# Patient Record
Sex: FEMALE | Race: WHITE | Employment: PART TIME | URBAN - METROPOLITAN AREA
[De-identification: names, ages, dates, MRNs, and addresses within clinical notes are randomized per-mention and may not be internally consistent; named-entity substitution may affect disease eponyms.]

---

## 2019-02-14 PROBLEM — F32.81 PMDD (PREMENSTRUAL DYSPHORIC DISORDER): Status: ACTIVE | Noted: 2019-02-14

## 2019-02-24 PROBLEM — R82.71 GBS BACTERIURIA: Status: ACTIVE | Noted: 2019-02-24

## 2019-06-23 PROBLEM — Z34.03 ENCOUNTER FOR SUPERVISION OF NORMAL FIRST PREGNANCY IN THIRD TRIMESTER: Status: ACTIVE | Noted: 2019-06-23

## 2019-08-26 ENCOUNTER — HOSPITAL ENCOUNTER (INPATIENT)
Age: 24
LOS: 3 days | Discharge: HOME | End: 2019-08-29
Attending: OBSTETRICS & GYNECOLOGY | Admitting: OBSTETRICS & GYNECOLOGY
Payer: COMMERCIAL

## 2019-08-26 PROBLEM — Z3A.39 39 WEEKS GESTATION OF PREGNANCY: Status: ACTIVE | Noted: 2019-08-26

## 2019-08-26 PROCEDURE — 1220000000 HC SEMI PRIVATE OB R&B

## 2019-08-26 RX ORDER — SODIUM CHLORIDE, SODIUM LACTATE, POTASSIUM CHLORIDE, CALCIUM CHLORIDE 600; 310; 30; 20 MG/100ML; MG/100ML; MG/100ML; MG/100ML
INJECTION, SOLUTION INTRAVENOUS CONTINUOUS
Status: DISCONTINUED | OUTPATIENT
Start: 2019-08-27 | End: 2019-08-27

## 2019-08-27 ENCOUNTER — ANESTHESIA EVENT (OUTPATIENT)
Dept: LABOR AND DELIVERY | Age: 24
End: 2019-08-27

## 2019-08-27 ENCOUNTER — ANESTHESIA (OUTPATIENT)
Dept: LABOR AND DELIVERY | Age: 24
End: 2019-08-27

## 2019-08-27 LAB
ABO/RH: NORMAL
AMPHETAMINE SCREEN, URINE: NOT DETECTED
ANTIBODY SCREEN: NORMAL
BARBITURATE SCREEN URINE: NOT DETECTED
BENZODIAZEPINE SCREEN, URINE: NOT DETECTED
CANNABINOID SCREEN URINE: NOT DETECTED
COCAINE METABOLITE SCREEN URINE: NOT DETECTED
HCT VFR BLD CALC: 35.7 % (ref 34–48)
HEMOGLOBIN: 11.7 G/DL (ref 11.5–15.5)
Lab: NORMAL
MCH RBC QN AUTO: 29.5 PG (ref 26–35)
MCHC RBC AUTO-ENTMCNC: 32.8 % (ref 32–34.5)
MCV RBC AUTO: 90.2 FL (ref 80–99.9)
METHADONE SCREEN, URINE: NOT DETECTED
OPIATE SCREEN URINE: NOT DETECTED
PDW BLD-RTO: 14.7 FL (ref 11.5–15)
PHENCYCLIDINE SCREEN URINE: NOT DETECTED
PLATELET # BLD: 221 E9/L (ref 130–450)
PMV BLD AUTO: 11.6 FL (ref 7–12)
PROPOXYPHENE SCREEN: NOT DETECTED
RBC # BLD: 3.96 E12/L (ref 3.5–5.5)
WBC # BLD: 9.9 E9/L (ref 4.5–11.5)

## 2019-08-27 PROCEDURE — 86850 RBC ANTIBODY SCREEN: CPT

## 2019-08-27 PROCEDURE — 2580000003 HC RX 258: Performed by: OBSTETRICS & GYNECOLOGY

## 2019-08-27 PROCEDURE — 85027 COMPLETE CBC AUTOMATED: CPT

## 2019-08-27 PROCEDURE — 2500000003 HC RX 250 WO HCPCS

## 2019-08-27 PROCEDURE — 86901 BLOOD TYPING SEROLOGIC RH(D): CPT

## 2019-08-27 PROCEDURE — 36415 COLL VENOUS BLD VENIPUNCTURE: CPT

## 2019-08-27 PROCEDURE — 80307 DRUG TEST PRSMV CHEM ANLYZR: CPT

## 2019-08-27 PROCEDURE — 1220000000 HC SEMI PRIVATE OB R&B

## 2019-08-27 PROCEDURE — 6370000000 HC RX 637 (ALT 250 FOR IP)

## 2019-08-27 PROCEDURE — 0KQM0ZZ REPAIR PERINEUM MUSCLE, OPEN APPROACH: ICD-10-PCS | Performed by: OBSTETRICS & GYNECOLOGY

## 2019-08-27 PROCEDURE — 6370000000 HC RX 637 (ALT 250 FOR IP): Performed by: OBSTETRICS & GYNECOLOGY

## 2019-08-27 PROCEDURE — 7200000001 HC VAGINAL DELIVERY

## 2019-08-27 PROCEDURE — 6360000002 HC RX W HCPCS

## 2019-08-27 PROCEDURE — 86900 BLOOD TYPING SEROLOGIC ABO: CPT

## 2019-08-27 RX ORDER — LANOLIN 100 %
OINTMENT (GRAM) TOPICAL PRN
Status: DISCONTINUED | OUTPATIENT
Start: 2019-08-27 | End: 2019-08-29 | Stop reason: HOSPADM

## 2019-08-27 RX ORDER — DOCUSATE SODIUM 100 MG/1
100 CAPSULE, LIQUID FILLED ORAL 2 TIMES DAILY
Status: DISCONTINUED | OUTPATIENT
Start: 2019-08-27 | End: 2019-08-29 | Stop reason: HOSPADM

## 2019-08-27 RX ORDER — NALBUPHINE HCL 10 MG/ML
5 AMPUL (ML) INJECTION EVERY 4 HOURS PRN
Status: DISCONTINUED | OUTPATIENT
Start: 2019-08-27 | End: 2019-08-27

## 2019-08-27 RX ORDER — ONDANSETRON 2 MG/ML
4 INJECTION INTRAMUSCULAR; INTRAVENOUS EVERY 6 HOURS PRN
Status: DISCONTINUED | OUTPATIENT
Start: 2019-08-27 | End: 2019-08-27

## 2019-08-27 RX ORDER — SODIUM CHLORIDE 0.9 % (FLUSH) 0.9 %
10 SYRINGE (ML) INJECTION PRN
Status: DISCONTINUED | OUTPATIENT
Start: 2019-08-27 | End: 2019-08-29 | Stop reason: HOSPADM

## 2019-08-27 RX ORDER — FLUOXETINE 10 MG/1
10 TABLET, FILM COATED ORAL DAILY
Status: DISCONTINUED | OUTPATIENT
Start: 2019-08-27 | End: 2019-08-29 | Stop reason: HOSPADM

## 2019-08-27 RX ORDER — IBUPROFEN 800 MG/1
800 TABLET ORAL EVERY 8 HOURS
Status: DISCONTINUED | OUTPATIENT
Start: 2019-08-28 | End: 2019-08-29 | Stop reason: HOSPADM

## 2019-08-27 RX ORDER — FLUOXETINE 10 MG/1
10 CAPSULE ORAL DAILY
Status: DISCONTINUED | OUTPATIENT
Start: 2019-08-27 | End: 2019-08-27 | Stop reason: CLARIF

## 2019-08-27 RX ORDER — IBUPROFEN 800 MG/1
TABLET ORAL
Status: COMPLETED
Start: 2019-08-27 | End: 2019-08-27

## 2019-08-27 RX ORDER — LIDOCAINE HYDROCHLORIDE 10 MG/ML
INJECTION, SOLUTION INFILTRATION; PERINEURAL
Status: DISCONTINUED
Start: 2019-08-27 | End: 2019-08-27

## 2019-08-27 RX ORDER — NALOXONE HYDROCHLORIDE 0.4 MG/ML
0.4 INJECTION, SOLUTION INTRAMUSCULAR; INTRAVENOUS; SUBCUTANEOUS PRN
Status: DISCONTINUED | OUTPATIENT
Start: 2019-08-27 | End: 2019-08-27

## 2019-08-27 RX ORDER — PRENATAL WITH FERROUS FUM AND FOLIC ACID 3080; 920; 120; 400; 22; 1.84; 3; 20; 10; 1; 12; 200; 27; 25; 2 [IU]/1; [IU]/1; MG/1; [IU]/1; MG/1; MG/1; MG/1; MG/1; MG/1; MG/1; UG/1; MG/1; MG/1; MG/1; MG/1
1 TABLET ORAL DAILY
Status: DISCONTINUED | OUTPATIENT
Start: 2019-08-27 | End: 2019-08-29 | Stop reason: HOSPADM

## 2019-08-27 RX ORDER — SODIUM CHLORIDE 0.9 % (FLUSH) 0.9 %
10 SYRINGE (ML) INJECTION EVERY 12 HOURS SCHEDULED
Status: DISCONTINUED | OUTPATIENT
Start: 2019-08-27 | End: 2019-08-29 | Stop reason: HOSPADM

## 2019-08-27 RX ORDER — SODIUM CHLORIDE, SODIUM LACTATE, POTASSIUM CHLORIDE, CALCIUM CHLORIDE 600; 310; 30; 20 MG/100ML; MG/100ML; MG/100ML; MG/100ML
INJECTION, SOLUTION INTRAVENOUS CONTINUOUS
Status: DISCONTINUED | OUTPATIENT
Start: 2019-08-27 | End: 2019-08-29 | Stop reason: HOSPADM

## 2019-08-27 RX ORDER — ACETAMINOPHEN 325 MG/1
650 TABLET ORAL EVERY 4 HOURS PRN
Status: DISCONTINUED | OUTPATIENT
Start: 2019-08-27 | End: 2019-08-29 | Stop reason: HOSPADM

## 2019-08-27 RX ORDER — FERROUS SULFATE 325(65) MG
325 TABLET ORAL 2 TIMES DAILY WITH MEALS
Status: DISCONTINUED | OUTPATIENT
Start: 2019-08-27 | End: 2019-08-29 | Stop reason: HOSPADM

## 2019-08-27 RX ORDER — ACETAMINOPHEN 650 MG
TABLET, EXTENDED RELEASE ORAL
Status: COMPLETED
Start: 2019-08-27 | End: 2019-08-27

## 2019-08-27 RX ORDER — BISACODYL 10 MG
10 SUPPOSITORY, RECTAL RECTAL PRN
Status: DISCONTINUED | OUTPATIENT
Start: 2019-08-27 | End: 2019-08-29 | Stop reason: HOSPADM

## 2019-08-27 RX ORDER — HYDROCODONE BITARTRATE AND ACETAMINOPHEN 5; 325 MG/1; MG/1
2 TABLET ORAL EVERY 4 HOURS PRN
Status: DISCONTINUED | OUTPATIENT
Start: 2019-08-27 | End: 2019-08-29 | Stop reason: HOSPADM

## 2019-08-27 RX ORDER — HYDROCODONE BITARTRATE AND ACETAMINOPHEN 5; 325 MG/1; MG/1
1 TABLET ORAL EVERY 4 HOURS PRN
Status: DISCONTINUED | OUTPATIENT
Start: 2019-08-27 | End: 2019-08-29 | Stop reason: HOSPADM

## 2019-08-27 RX ADMIN — BENZOCAINE AND LEVOMENTHOL: 200; 5 SPRAY TOPICAL at 06:10

## 2019-08-27 RX ADMIN — DOCUSATE SODIUM 100 MG: 100 CAPSULE, LIQUID FILLED ORAL at 21:19

## 2019-08-27 RX ADMIN — IBUPROFEN 800 MG: 800 TABLET, FILM COATED ORAL at 18:51

## 2019-08-27 RX ADMIN — SODIUM CHLORIDE, POTASSIUM CHLORIDE, SODIUM LACTATE AND CALCIUM CHLORIDE: 600; 310; 30; 20 INJECTION, SOLUTION INTRAVENOUS at 00:15

## 2019-08-27 RX ADMIN — LIDOCAINE HYDROCHLORIDE: 10 INJECTION, SOLUTION INFILTRATION; PERINEURAL at 03:20

## 2019-08-27 RX ADMIN — SODIUM CHLORIDE, POTASSIUM CHLORIDE, SODIUM LACTATE AND CALCIUM CHLORIDE: 600; 310; 30; 20 INJECTION, SOLUTION INTRAVENOUS at 01:50

## 2019-08-27 RX ADMIN — Medication 999 MILLI-UNITS/MIN: at 03:20

## 2019-08-27 RX ADMIN — Medication: at 10:10

## 2019-08-27 RX ADMIN — Medication: at 03:18

## 2019-08-27 RX ADMIN — HYDROCODONE BITARTRATE AND ACETAMINOPHEN 1 TABLET: 5; 325 TABLET ORAL at 16:00

## 2019-08-27 RX ADMIN — HYDROCODONE BITARTRATE AND ACETAMINOPHEN 1 TABLET: 5; 325 TABLET ORAL at 06:10

## 2019-08-27 RX ADMIN — DOCUSATE SODIUM 100 MG: 100 CAPSULE, LIQUID FILLED ORAL at 10:10

## 2019-08-27 ASSESSMENT — PAIN SCALES - GENERAL
PAINLEVEL_OUTOF10: 0
PAINLEVEL_OUTOF10: 4
PAINLEVEL_OUTOF10: 0
PAINLEVEL_OUTOF10: 5
PAINLEVEL_OUTOF10: 0
PAINLEVEL_OUTOF10: 6

## 2019-08-27 ASSESSMENT — PAIN DESCRIPTION - RADICULAR PAIN: RADICULAR_PAIN: ABSENT

## 2019-08-27 NOTE — H&P
CHIEF COMPLAINT:  contractions    HISTORY OF PRESENT ILLNESS:      The patient is a 21 y.o. female at 37w11d presents with complaint of contractions that started Saturday and have slowly increased in frequency until tonight when they became every 5 minutes. OB History        1    Para        Term                AB        Living           SAB        TAB        Ectopic        Molar        Multiple        Live Births                Patient presents with a chief complaint as above and is being admitted for latent labor    Estimated Due Date: Estimated Date of Delivery: 19    PRENATAL CARE:    Complicated by: none    PAST OB HISTORY  OB History        1    Para        Term                AB        Living           SAB        TAB        Ectopic        Molar        Multiple        Live Births                    Past Medical History:    No past medical history on file. Past Surgical History:        Procedure Laterality Date    CYST REMOVAL      OTHER SURGICAL HISTORY      back cyst removed    TONSILLECTOMY      WISDOM TOOTH EXTRACTION       Allergies:  Patient has no known allergies.   Social History:    Social History     Socioeconomic History    Marital status: Single     Spouse name: Not on file    Number of children: Not on file    Years of education: Not on file    Highest education level: Not on file   Occupational History    Not on file   Social Needs    Financial resource strain: Not on file    Food insecurity:     Worry: Not on file     Inability: Not on file    Transportation needs:     Medical: Not on file     Non-medical: Not on file   Tobacco Use    Smoking status: Never Smoker    Smokeless tobacco: Never Used   Substance and Sexual Activity    Alcohol use: Not Currently     Comment: occasinoally    Drug use: No    Sexual activity: Not on file   Lifestyle    Physical activity:     Days per week: Not on file     Minutes per session: Not on file    Stress: Not on file   Relationships    Social connections:     Talks on phone: Not on file     Gets together: Not on file     Attends Restoration service: Not on file     Active member of club or organization: Not on file     Attends meetings of clubs or organizations: Not on file     Relationship status: Not on file    Intimate partner violence:     Fear of current or ex partner: Not on file     Emotionally abused: Not on file     Physically abused: Not on file     Forced sexual activity: Not on file   Other Topics Concern    Not on file   Social History Narrative    Not on file     Family History:   No family history on file. Medications Prior to Admission:  Medications Prior to Admission: FLUoxetine (PROZAC) 10 MG capsule, Take 10 mg by mouth daily    REVIEW OF SYSTEMS:    CONSTITUTIONAL:  negative  RESPIRATORY:  negative  CARDIOVASCULAR:  negative  GASTROINTESTINAL:  negative  ALLERGIC/IMMUNOLOGIC:  negative  NEUROLOGICAL:  negative  BEHAVIOR/PSYCH:  negative    PHYSICAL EXAM:  Vitals:    08/26/19 2222   BP: 112/74   Pulse: 85   Resp: 16   Temp: 98.5 °F (36.9 °C)   TempSrc: Oral     General appearance:  awake, alert, cooperative, no apparent distress, and appears stated age  Neurologic:  Awake, alert, oriented to name, place and time. Lungs:  No increased work of breathing, good air exchange  Abdomen:  Soft, non tender, gravid, consistent with her gestational age   Fetal heart rate:  Reassuring.   Pelvis:  Adequate pelvis  Cervix: 3 cm 100% soft -1  Contraction frequency:  2-4 minutes    Membranes:  Intact    ASSESSMENT AND PLAN:    Labor: admit  Fetus: Reassuring  Other: d/w Dr Miguel Angel Davis      Electronically signed by Donavon Clark DO on 8/26/2019 at 10:53 PM

## 2019-08-28 LAB
HCT VFR BLD CALC: 35.3 % (ref 34–48)
HEMOGLOBIN: 10.9 G/DL (ref 11.5–15.5)

## 2019-08-28 PROCEDURE — 36415 COLL VENOUS BLD VENIPUNCTURE: CPT

## 2019-08-28 PROCEDURE — 85018 HEMOGLOBIN: CPT

## 2019-08-28 PROCEDURE — 85014 HEMATOCRIT: CPT

## 2019-08-28 PROCEDURE — 1220000000 HC SEMI PRIVATE OB R&B

## 2019-08-28 PROCEDURE — 6370000000 HC RX 637 (ALT 250 FOR IP): Performed by: OBSTETRICS & GYNECOLOGY

## 2019-08-28 RX ORDER — IBUPROFEN 800 MG/1
800 TABLET ORAL EVERY 8 HOURS
Qty: 30 TABLET | Refills: 0 | Status: SHIPPED | OUTPATIENT
Start: 2019-08-28 | End: 2019-10-09

## 2019-08-28 RX ADMIN — DOCUSATE SODIUM 100 MG: 100 CAPSULE, LIQUID FILLED ORAL at 09:39

## 2019-08-28 RX ADMIN — IBUPROFEN 800 MG: 800 TABLET, FILM COATED ORAL at 21:30

## 2019-08-28 RX ADMIN — Medication 1 TABLET: at 09:39

## 2019-08-28 RX ADMIN — IBUPROFEN 800 MG: 800 TABLET, FILM COATED ORAL at 03:58

## 2019-08-28 RX ADMIN — HYDROCODONE BITARTRATE AND ACETAMINOPHEN 1 TABLET: 5; 325 TABLET ORAL at 13:53

## 2019-08-28 RX ADMIN — IBUPROFEN 800 MG: 800 TABLET, FILM COATED ORAL at 12:12

## 2019-08-28 RX ADMIN — DOCUSATE SODIUM 100 MG: 100 CAPSULE, LIQUID FILLED ORAL at 21:31

## 2019-08-28 ASSESSMENT — PAIN DESCRIPTION - PROGRESSION: CLINICAL_PROGRESSION: NOT CHANGED

## 2019-08-28 ASSESSMENT — PAIN SCALES - GENERAL
PAINLEVEL_OUTOF10: 4
PAINLEVEL_OUTOF10: 5
PAINLEVEL_OUTOF10: 4
PAINLEVEL_OUTOF10: 4

## 2019-08-28 ASSESSMENT — PAIN DESCRIPTION - RADICULAR PAIN: RADICULAR_PAIN: ABSENT

## 2019-08-28 NOTE — LACTATION NOTE
Left nipple noted to be reddened and sore, shield appears to have been placed incorrectly for a feeding. Encouraged to make sure nipple is centered in the shield during feedings. Gel pads given and explained. Baby nursing on the right, no c/o pain with this feeding, nipple appears to fit correctly in the shield. Encouraged mom to call with any latch assistance needed.

## 2019-08-28 NOTE — PROGRESS NOTES
CLINICAL PHARMACY NOTE: MEDS TO [address] Select Patient?: Yes  Total # of Prescriptions Filled: 1   The following medications were delivered to the patient:  · Motrin 800  Total # of Interventions Completed: 3  Time Spent (min): 30    Additional Documentation:

## 2019-08-29 VITALS
WEIGHT: 160 LBS | SYSTOLIC BLOOD PRESSURE: 113 MMHG | RESPIRATION RATE: 16 BRPM | HEIGHT: 61 IN | TEMPERATURE: 97.9 F | BODY MASS INDEX: 30.21 KG/M2 | DIASTOLIC BLOOD PRESSURE: 71 MMHG | HEART RATE: 64 BPM

## 2019-08-29 PROCEDURE — 6370000000 HC RX 637 (ALT 250 FOR IP): Performed by: OBSTETRICS & GYNECOLOGY

## 2019-08-29 RX ADMIN — DOCUSATE SODIUM 100 MG: 100 CAPSULE, LIQUID FILLED ORAL at 09:43

## 2019-08-29 RX ADMIN — IBUPROFEN 800 MG: 800 TABLET, FILM COATED ORAL at 09:43

## 2019-08-29 ASSESSMENT — PAIN SCALES - GENERAL: PAINLEVEL_OUTOF10: 4

## 2019-08-29 NOTE — DISCHARGE SUMMARY
Obstetrical Discharge Form        Patient ID:  Juan Hernández  [id number]  6 y.o.  1995    Admit date: 2019    Discharge date: 2019     Admitting Physician: Agustina Hinton Diagnoses: 44 weeks gestation of pregnancy [Z3A.39]    Final Diagnosis:   Active Problems:    39 weeks gestation of pregnancy  Resolved Problems:    * No resolved hospital problems. *      Discharged Condition: good      Gestational Age:40w0d    Antepartum complications: none    Date of Delivery: 2019      Type of Delivery: vaginal, spontaneous    Delivered By:   Agustina Leong           Baby:     Information for the patient's :  [facility]. [[id number]]          Anesthesia: Epidural    Intrapartum complications: None    Feeding method: breast    Hospital Course: Uneventful.   Patient recovered well without any issues     Discharged Condition: stable to home    Discharge Medication:    Carine Crowder   Home Medication Instructions BIX:[id number]    Printed on:19 1555   Medication Information                      FLUoxetine (PROZAC) 10 MG capsule  Take 10 mg by mouth daily             ibuprofen (ADVIL;MOTRIN) 800 MG tablet  Take 1 tablet by mouth every 8 hours             Prenatal Multivit-Min-Fe-FA (PRENATAL VITAMINS PO)  Take 1 tablet by mouth daily                  Postpartum complications: none    Note that over 30 minutes was spent in preparing discharge papers, discussing discharge with patient, medication review, etc.    Discharge Date: 2019    Plan:   Follow up in 6 week(s)

## 2019-10-10 ENCOUNTER — TELEPHONE (OUTPATIENT)
Dept: ADMINISTRATIVE | Age: 24
End: 2019-10-10

## 2021-03-17 PROBLEM — Z34.03 ENCOUNTER FOR SUPERVISION OF NORMAL FIRST PREGNANCY IN THIRD TRIMESTER: Status: RESOLVED | Noted: 2019-06-23 | Resolved: 2021-03-17

## 2021-03-17 PROBLEM — Z3A.39 39 WEEKS GESTATION OF PREGNANCY: Status: RESOLVED | Noted: 2019-08-26 | Resolved: 2021-03-17

## 2025-06-09 ENCOUNTER — HOSPITAL ENCOUNTER (INPATIENT)
Age: 30
LOS: 3 days | Discharge: HOME | DRG: 750 | End: 2025-06-12
Attending: EMERGENCY MEDICINE | Admitting: PSYCHIATRY & NEUROLOGY
Payer: COMMERCIAL

## 2025-06-09 DIAGNOSIS — R45.851 SUICIDAL IDEATION: Primary | ICD-10-CM

## 2025-06-09 PROBLEM — F31.12 BIPOLAR 1 DISORDER, MANIC, MODERATE (HCC): Status: ACTIVE | Noted: 2025-06-09

## 2025-06-09 LAB
AMPHET UR QL SCN: NEGATIVE
ANION GAP SERPL CALCULATED.3IONS-SCNC: 12 MMOL/L (ref 7–16)
APAP SERPL-MCNC: <5 UG/ML (ref 10–30)
BARBITURATES UR QL SCN: NEGATIVE
BASOPHILS # BLD: 0.02 K/UL (ref 0–0.2)
BASOPHILS NFR BLD: 0 % (ref 0–2)
BENZODIAZ UR QL: NEGATIVE
BUN SERPL-MCNC: 9 MG/DL (ref 6–20)
BUPRENORPHINE UR QL: NEGATIVE
CALCIUM SERPL-MCNC: 9.3 MG/DL (ref 8.6–10)
CANNABINOIDS UR QL SCN: POSITIVE
CHLORIDE SERPL-SCNC: 99 MMOL/L (ref 98–107)
CO2 SERPL-SCNC: 26 MMOL/L (ref 22–29)
COCAINE UR QL SCN: NEGATIVE
CREAT SERPL-MCNC: 0.7 MG/DL (ref 0.5–1)
EOSINOPHIL # BLD: 0.06 K/UL (ref 0.05–0.5)
EOSINOPHILS RELATIVE PERCENT: 1 % (ref 0–6)
ERYTHROCYTE [DISTWIDTH] IN BLOOD BY AUTOMATED COUNT: 13.1 % (ref 11.5–15)
ETHANOLAMINE SERPL-MCNC: <10 MG/DL (ref 0–0.08)
FENTANYL UR QL: NEGATIVE
GFR, ESTIMATED: >90 ML/MIN/1.73M2
GLUCOSE SERPL-MCNC: 95 MG/DL (ref 74–99)
HCG UR QL: NEGATIVE
HCT VFR BLD AUTO: 38.7 % (ref 34–48)
HGB BLD-MCNC: 13 G/DL (ref 11.5–15.5)
IMM GRANULOCYTES # BLD AUTO: 0.06 K/UL (ref 0–0.58)
IMM GRANULOCYTES NFR BLD: 1 % (ref 0–5)
LYMPHOCYTES NFR BLD: 1.7 K/UL (ref 1.5–4)
LYMPHOCYTES RELATIVE PERCENT: 20 % (ref 20–42)
MAGNESIUM SERPL-MCNC: 1.7 MG/DL (ref 1.6–2.6)
MCH RBC QN AUTO: 33.3 PG (ref 26–35)
MCHC RBC AUTO-ENTMCNC: 33.6 G/DL (ref 32–34.5)
MCV RBC AUTO: 99.2 FL (ref 80–99.9)
METHADONE UR QL: NEGATIVE
MONOCYTES NFR BLD: 0.78 K/UL (ref 0.1–0.95)
MONOCYTES NFR BLD: 9 % (ref 2–12)
NEUTROPHILS NFR BLD: 70 % (ref 43–80)
NEUTS SEG NFR BLD: 5.96 K/UL (ref 1.8–7.3)
OPIATES UR QL SCN: NEGATIVE
OXYCODONE UR QL SCN: NEGATIVE
PCP UR QL SCN: NEGATIVE
PLATELET # BLD AUTO: 257 K/UL (ref 130–450)
PMV BLD AUTO: 9.8 FL (ref 7–12)
POTASSIUM SERPL-SCNC: 3.2 MMOL/L (ref 3.5–5.1)
POTASSIUM SERPL-SCNC: 3.6 MMOL/L (ref 3.5–5.1)
RBC # BLD AUTO: 3.9 M/UL (ref 3.5–5.5)
SALICYLATES SERPL-MCNC: <0.5 MG/DL (ref 0–30)
SODIUM SERPL-SCNC: 137 MMOL/L (ref 136–145)
TEST INFORMATION: ABNORMAL
TOXIC TRICYCLIC SC,BLOOD: NEGATIVE
WBC OTHER # BLD: 8.6 K/UL (ref 4.5–11.5)

## 2025-06-09 PROCEDURE — 1240000000 HC EMOTIONAL WELLNESS R&B

## 2025-06-09 PROCEDURE — 80048 BASIC METABOLIC PNL TOTAL CA: CPT

## 2025-06-09 PROCEDURE — 80143 DRUG ASSAY ACETAMINOPHEN: CPT

## 2025-06-09 PROCEDURE — 85025 COMPLETE CBC W/AUTO DIFF WBC: CPT

## 2025-06-09 PROCEDURE — 84703 CHORIONIC GONADOTROPIN ASSAY: CPT

## 2025-06-09 PROCEDURE — 84132 ASSAY OF SERUM POTASSIUM: CPT

## 2025-06-09 PROCEDURE — 93005 ELECTROCARDIOGRAM TRACING: CPT

## 2025-06-09 PROCEDURE — 80307 DRUG TEST PRSMV CHEM ANLYZR: CPT

## 2025-06-09 PROCEDURE — 90791 PSYCH DIAGNOSTIC EVALUATION: CPT

## 2025-06-09 PROCEDURE — 6370000000 HC RX 637 (ALT 250 FOR IP): Performed by: PSYCHIATRY & NEUROLOGY

## 2025-06-09 PROCEDURE — 80179 DRUG ASSAY SALICYLATE: CPT

## 2025-06-09 PROCEDURE — 99285 EMERGENCY DEPT VISIT HI MDM: CPT

## 2025-06-09 PROCEDURE — 83735 ASSAY OF MAGNESIUM: CPT

## 2025-06-09 PROCEDURE — G0480 DRUG TEST DEF 1-7 CLASSES: HCPCS

## 2025-06-09 RX ORDER — NICOTINE 21 MG/24HR
1 PATCH, TRANSDERMAL 24 HOURS TRANSDERMAL DAILY
Status: DISCONTINUED | OUTPATIENT
Start: 2025-06-09 | End: 2025-06-11

## 2025-06-09 RX ORDER — HALOPERIDOL 5 MG/ML
5 INJECTION INTRAMUSCULAR EVERY 6 HOURS PRN
Status: DISCONTINUED | OUTPATIENT
Start: 2025-06-09 | End: 2025-06-12 | Stop reason: HOSPADM

## 2025-06-09 RX ORDER — FLUOXETINE 10 MG/1
40 TABLET, FILM COATED ORAL DAILY
COMMUNITY

## 2025-06-09 RX ORDER — ATOMOXETINE 60 MG/1
60 CAPSULE ORAL DAILY
COMMUNITY

## 2025-06-09 RX ORDER — ACETAMINOPHEN 325 MG/1
650 TABLET ORAL EVERY 6 HOURS PRN
Status: DISCONTINUED | OUTPATIENT
Start: 2025-06-09 | End: 2025-06-12 | Stop reason: HOSPADM

## 2025-06-09 RX ORDER — HALOPERIDOL 5 MG/1
5 TABLET ORAL EVERY 6 HOURS PRN
Status: DISCONTINUED | OUTPATIENT
Start: 2025-06-09 | End: 2025-06-12 | Stop reason: HOSPADM

## 2025-06-09 RX ORDER — HYDROXYZINE HYDROCHLORIDE 50 MG/1
50 TABLET, FILM COATED ORAL 3 TIMES DAILY PRN
Status: DISCONTINUED | OUTPATIENT
Start: 2025-06-09 | End: 2025-06-12 | Stop reason: HOSPADM

## 2025-06-09 RX ORDER — ATOMOXETINE 40 MG/1
40 CAPSULE ORAL DAILY
Status: ON HOLD | COMMUNITY
End: 2025-06-12 | Stop reason: HOSPADM

## 2025-06-09 RX ORDER — MAGNESIUM HYDROXIDE/ALUMINUM HYDROXICE/SIMETHICONE 120; 1200; 1200 MG/30ML; MG/30ML; MG/30ML
30 SUSPENSION ORAL PRN
Status: DISCONTINUED | OUTPATIENT
Start: 2025-06-09 | End: 2025-06-12 | Stop reason: HOSPADM

## 2025-06-09 RX ADMIN — Medication 3 MG: at 21:09

## 2025-06-09 ASSESSMENT — PATIENT HEALTH QUESTIONNAIRE - PHQ9
10. IF YOU CHECKED OFF ANY PROBLEMS, HOW DIFFICULT HAVE THESE PROBLEMS MADE IT FOR YOU TO DO YOUR WORK, TAKE CARE OF THINGS AT HOME, OR GET ALONG WITH OTHER PEOPLE: VERY DIFFICULT
7. TROUBLE CONCENTRATING ON THINGS, SUCH AS READING THE NEWSPAPER OR WATCHING TELEVISION: SEVERAL DAYS
5. POOR APPETITE OR OVEREATING: NOT AT ALL
SUM OF ALL RESPONSES TO PHQ QUESTIONS 1-9: 7
2. FEELING DOWN, DEPRESSED OR HOPELESS: MORE THAN HALF THE DAYS
SUM OF ALL RESPONSES TO PHQ QUESTIONS 1-9: 8
8. MOVING OR SPEAKING SO SLOWLY THAT OTHER PEOPLE COULD HAVE NOTICED. OR THE OPPOSITE, BEING SO FIGETY OR RESTLESS THAT YOU HAVE BEEN MOVING AROUND A LOT MORE THAN USUAL: NOT AT ALL
9. THOUGHTS THAT YOU WOULD BE BETTER OFF DEAD, OR OF HURTING YOURSELF: SEVERAL DAYS
1. LITTLE INTEREST OR PLEASURE IN DOING THINGS: MORE THAN HALF THE DAYS
4. FEELING TIRED OR HAVING LITTLE ENERGY: SEVERAL DAYS
SUM OF ALL RESPONSES TO PHQ QUESTIONS 1-9: 8
3. TROUBLE FALLING OR STAYING ASLEEP: SEVERAL DAYS
SUM OF ALL RESPONSES TO PHQ QUESTIONS 1-9: 8
6. FEELING BAD ABOUT YOURSELF - OR THAT YOU ARE A FAILURE OR HAVE LET YOURSELF OR YOUR FAMILY DOWN: NOT AT ALL

## 2025-06-09 ASSESSMENT — SLEEP AND FATIGUE QUESTIONNAIRES
SLEEP PATTERN: DIFFICULTY FALLING ASLEEP
DO YOU USE A SLEEP AID: NO
AVERAGE NUMBER OF SLEEP HOURS: 4
DO YOU HAVE DIFFICULTY SLEEPING: YES

## 2025-06-09 ASSESSMENT — LIFESTYLE VARIABLES
HOW OFTEN DO YOU HAVE A DRINK CONTAINING ALCOHOL: 2-3 TIMES A WEEK
HOW MANY STANDARD DRINKS CONTAINING ALCOHOL DO YOU HAVE ON A TYPICAL DAY: 3 OR 4
HOW OFTEN DO YOU HAVE A DRINK CONTAINING ALCOHOL: 2-3 TIMES A WEEK
HOW MANY STANDARD DRINKS CONTAINING ALCOHOL DO YOU HAVE ON A TYPICAL DAY: 3 OR 4

## 2025-06-09 ASSESSMENT — PAIN - FUNCTIONAL ASSESSMENT: PAIN_FUNCTIONAL_ASSESSMENT: NONE - DENIES PAIN

## 2025-06-09 NOTE — VIRTUAL HEALTH
Received request for Telepsychiatry evaluation.  Unable to reach site on multiple attempts, to coordinate moving video equipment for evaluation.  Will check back later, or reach out via 2C2Pve in the interim, if ER staff are available to assist and Medical screening labs are still pending.  Our team will continue to follow and will initiate evaluation when more information is available    I attempted to call to gain more information however during shift change and call was left on hold until it eventually hung up.    Patient made statement that she does not drink and stated that she does drink.  At this time we are waiting for EtOH levels to be resulted                  Daniela De La Cruz, was evaluated through a synchronous (real-time) audio-video encounter. The patient (and/or guardian if applicable) is aware that this is a billable service, which includes applicable co-pays. This virtual visit was conducted with patient's (and/or legal guardian's) consent. Patient identification was verified, and a caregiver was present when appropriate.  The patient was located at Facility (Appt Department): [facility] EMERGENCY DEPARTMENT  [facility] [place] [address]  Loc: [phone]  The provider was located at Home (City/State): [place]  Confirm you are appropriately licensed, registered, or certified to deliver care in the state where the patient is located as indicated above. If you are not or unsure, please re-schedule the visit: Yes, I confirm.   [place] Consult to Tele-Psych  Consult performed by: Nena Carbajal APRN - CNP  Consult ordered by: Leila Mcneill MD  Reason for consult: Psychiatric evaluation           Total time spent on this encounter: Not billed by time    --TANIYA Lowery CNP on 6/9/2025 at 6:41 AM    An electronic signature was used to authenticate this note.    
started to do anything, or prepared to do anything to end your life? Yes   Did this occur within the past 3 months?  No    :    Protective Factors:  Protective: Female gender, Does not have lethal plan, Does not have access to guns, No active psychosis or cognitive dysfunction, Has outpatient services in place, and Compliant with recommended medications  Risk Factors:  Risk Factors: Depressed mood, Active suicidal ideation, Prior suicide attempt, Recent significant loss, and No collateral information to support safety      Overall Level Suicide Risk:     TelePsych CSSRS Risk Level: High Risk    Brief ClinicalSummary:   Patient is a 29 y.o.  female who presents for suicidal ideation. Pt denies a plan currently but states she has multiple previous attempts and usually doesn't plan them, she just acts impulsively, and is concerned she will harm herself if she doesn't get help. She reports multiple psychosocial stressors, limited support, and doesn't feel her medications are helping to manage her symptoms. Pt denies HI, AH/VH, access to weapons but continues to endorse SI and is unable to contract for safety. Pt requires inpatient psychiatric admission for safety and stabilization. ED provider in agreement.     Dx:   Suicidal Ideation  Mood Disorder     Plan:  Inpatient psychiatric admission at appropriate care level facility, once medically cleared and stable  Ongoing medical management and stabilization per primary team.  Re-consult for any new changes or concerns. Thank you for this consult.  Discussed recommendations with Trena Arias MD at time of consult completion.    TelePsych recommendations:Inpatient psychiatric admission            Safety Plan:  Reviewed and Updated      Electronically signed by KALLI Allison on 6/9/2025 at 8:54 AM.     Daniela De La Cruz, was evaluated through a synchronous (real-time) audio-video encounter. The patient (and/or guardian if applicable) is aware that this is a

## 2025-06-09 NOTE — ED NOTES
Nurse to nurse report given to DAYANNA Sheikh on [address] , which includes patient presentation complaint, pink slip, medications, lab results EKG Qtc, and past medical/psych history and admitting orders.

## 2025-06-09 NOTE — ED PROVIDER NOTES
[facility] EMERGENCY DEPARTMENT  EMERGENCY DEPARTMENT ENCOUNTER        Pt Name: Daniela De La Cruz  MRN: [id number]  Birthdate 1995  Date of evaluation: 6/9/2025  Provider: Leila Mcneill MD  PCP: None, None  Note Started: 6:55 AM EDT 6/9/25    CHIEF COMPLAINT       Chief Complaint   Patient presents with    Psychiatric Evaluation     Patient tearful, states that she is suicidal and that if she doesn't check in that she will hurt herself.  States she is having marriage issues.  Denies HI.       HISTORY OF PRESENT ILLNESS: 1 or more Elements   History From: Patient    Limitations to history : None    Daniela De La Cruz is a 29 y.o. female who presents for psychiatric evaluation.  The patient states she has suicidal ideation and is fearful of herself.  She states she is having marriage issues.  She is recently told her  that she no longer wants to be  and she states he is not taking a while and gaslighting her.  She reports she has had issues with suicidal ideation since she was 7 years old and has been going through therapy.  She denies a plan to harm herself.  She states she needed to come to the hospital or she may harm herself or her .  Denies alcohol or drug use recently but states she does drink about 3 times a week.  She has never gone through withdrawals.  Denies chest pain, shortness of breath, abdominal pain, headache, dizziness or other associated symptoms.    Nursing Notes were all reviewed and agreed with or any disagreements were addressed in the HPI.        REVIEW OF EXTERNAL NOTE :       Patient was seen in office on 8/22/2024 by [facility] clinic for rash    REVIEW OF SYSTEMS :           Positives and Pertinent negatives as per HPI.     SURGICAL HISTORY     Past Surgical History:   Procedure Laterality Date    CYST REMOVAL      OTHER SURGICAL HISTORY      back cyst removed    TONSILLECTOMY      WISDOM TOOTH EXTRACTION         CURRENTMEDICATIONS

## 2025-06-09 NOTE — ED NOTES
Pt's belongings including a phone placed in a labeled belongings bag with a pink/purple backpack placed in locker 26 in G unit

## 2025-06-10 PROBLEM — F31.12 BIPOLAR 1 DISORDER, MANIC, MODERATE (HCC): Status: RESOLVED | Noted: 2025-06-09 | Resolved: 2025-06-10

## 2025-06-10 PROBLEM — F60.89 CLUSTER B PERSONALITY DISORDER (HCC): Status: ACTIVE | Noted: 2025-06-10

## 2025-06-10 PROBLEM — F33.9 MAJOR DEPRESSIVE DISORDER, RECURRENT EPISODE WITH MIXED FEATURES: Status: ACTIVE | Noted: 2025-06-10

## 2025-06-10 LAB
CHOLEST SERPL-MCNC: 178 MG/DL
EKG ATRIAL RATE: 65 BPM
EKG P AXIS: 40 DEGREES
EKG P-R INTERVAL: 124 MS
EKG Q-T INTERVAL: 414 MS
EKG QRS DURATION: 88 MS
EKG QTC CALCULATION (BAZETT): 430 MS
EKG R AXIS: -14 DEGREES
EKG T AXIS: 25 DEGREES
EKG VENTRICULAR RATE: 65 BPM
HBA1C MFR BLD: 5.3 % (ref 4–5.6)
HDLC SERPL-MCNC: 68 MG/DL
LDLC SERPL CALC-MCNC: 96 MG/DL
TRIGL SERPL-MCNC: 70 MG/DL
VLDLC SERPL CALC-MCNC: 14 MG/DL

## 2025-06-10 PROCEDURE — 87081 CULTURE SCREEN ONLY: CPT

## 2025-06-10 PROCEDURE — 93010 ELECTROCARDIOGRAM REPORT: CPT | Performed by: INTERNAL MEDICINE

## 2025-06-10 PROCEDURE — 87210 SMEAR WET MOUNT SALINE/INK: CPT

## 2025-06-10 PROCEDURE — 1240000000 HC EMOTIONAL WELLNESS R&B

## 2025-06-10 PROCEDURE — 6370000000 HC RX 637 (ALT 250 FOR IP): Performed by: NURSE PRACTITIONER

## 2025-06-10 PROCEDURE — 83036 HEMOGLOBIN GLYCOSYLATED A1C: CPT

## 2025-06-10 PROCEDURE — 36415 COLL VENOUS BLD VENIPUNCTURE: CPT

## 2025-06-10 PROCEDURE — 80061 LIPID PANEL: CPT

## 2025-06-10 RX ORDER — ATOMOXETINE 100 MG/1
100 CAPSULE ORAL NIGHTLY
Status: DISCONTINUED | OUTPATIENT
Start: 2025-06-10 | End: 2025-06-12 | Stop reason: HOSPADM

## 2025-06-10 RX ORDER — OXCARBAZEPINE 150 MG/1
150 TABLET, FILM COATED ORAL 2 TIMES DAILY
Status: DISCONTINUED | OUTPATIENT
Start: 2025-06-10 | End: 2025-06-12 | Stop reason: HOSPADM

## 2025-06-10 RX ADMIN — FLUOXETINE HYDROCHLORIDE 40 MG: 20 CAPSULE ORAL at 20:25

## 2025-06-10 RX ADMIN — ATOMOXETINE 100 MG: 100 CAPSULE ORAL at 20:25

## 2025-06-10 RX ADMIN — OXCARBAZEPINE 150 MG: 150 TABLET, FILM COATED ORAL at 21:10

## 2025-06-10 ASSESSMENT — PATIENT HEALTH QUESTIONNAIRE - PHQ9
7. TROUBLE CONCENTRATING ON THINGS, SUCH AS READING THE NEWSPAPER OR WATCHING TELEVISION: SEVERAL DAYS
6. FEELING BAD ABOUT YOURSELF - OR THAT YOU ARE A FAILURE OR HAVE LET YOURSELF OR YOUR FAMILY DOWN: SEVERAL DAYS
10. IF YOU CHECKED OFF ANY PROBLEMS, HOW DIFFICULT HAVE THESE PROBLEMS MADE IT FOR YOU TO DO YOUR WORK, TAKE CARE OF THINGS AT HOME, OR GET ALONG WITH OTHER PEOPLE: VERY DIFFICULT
9. THOUGHTS THAT YOU WOULD BE BETTER OFF DEAD, OR OF HURTING YOURSELF: NEARLY EVERY DAY
SUM OF ALL RESPONSES TO PHQ QUESTIONS 1-9: 13
8. MOVING OR SPEAKING SO SLOWLY THAT OTHER PEOPLE COULD HAVE NOTICED. OR THE OPPOSITE, BEING SO FIGETY OR RESTLESS THAT YOU HAVE BEEN MOVING AROUND A LOT MORE THAN USUAL: MORE THAN HALF THE DAYS
SUM OF ALL RESPONSES TO PHQ QUESTIONS 1-9: 10
1. LITTLE INTEREST OR PLEASURE IN DOING THINGS: SEVERAL DAYS
4. FEELING TIRED OR HAVING LITTLE ENERGY: MORE THAN HALF THE DAYS
3. TROUBLE FALLING OR STAYING ASLEEP: SEVERAL DAYS
2. FEELING DOWN, DEPRESSED OR HOPELESS: MORE THAN HALF THE DAYS
5. POOR APPETITE OR OVEREATING: NOT AT ALL

## 2025-06-10 ASSESSMENT — SLEEP AND FATIGUE QUESTIONNAIRES
DO YOU USE A SLEEP AID: YES
SLEEP PATTERN: NORMAL
AVERAGE NUMBER OF SLEEP HOURS: 10
DO YOU HAVE DIFFICULTY SLEEPING: NO

## 2025-06-10 ASSESSMENT — LIFESTYLE VARIABLES: HOW OFTEN DO YOU HAVE A DRINK CONTAINING ALCOHOL: 2-3 TIMES A WEEK

## 2025-06-10 NOTE — GROUP NOTE
Group Therapy Note    Date: 6/10/2025    Group Start Time: 1045  Group End Time: 1130  Group Topic: Psychotherapy    [facility]    Allison Storey MSW, LSW        Group Therapy Note    Attendees: 6       Patient's Goal:  To increase social interaction and improve relationships with others.      Notes:  Pt was attentive in group and was able to identify an agenda. They were also able to verbalize relating to others within the group.      Status After Intervention:  Improved    Participation Level: Active Listener and Interactive    Participation Quality: Appropriate, Attentive, Sharing, and Supportive      Speech:  normal      Thought Process/Content: Logical  Linear      Affective Functioning: Congruent      Mood: anxious      Level of consciousness:  Alert, Oriented x4, and Attentive      Response to Learning: Able to verbalize current knowledge/experience, Able to verbalize/acknowledge new learning, Able to retain information, and Capable of insight      Endings: None Reported    Modes of Intervention: Support, Socialization, and Exploration      Discipline Responsible: /Counselor      Signature:  KENNETH Ty LSW
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
## 2025-06-10 NOTE — CARE COORDINATION
SW contacted pt's grandmother Jessica [phone] (MARCEL signed). No answer, a voicemail was left.    SW contacted [facility] [phone] and was advised their  will call the pt in about a week. After she completes the intake, she can be referred to Ivanna Tabares.

## 2025-06-10 NOTE — CARE COORDINATION
NAKUL received a call from pt's grandmother Jessica [phone] (MARCEL signed). Jessica stated the pt and her  got into an argument and the pt said she was coming here before something bad happened. Jessica stated she doesn't know a lot about what is going on and she does not see the pt often. She reports the pt seems like herself whenever she is with her.     Jessica confirmed the pt can stay with her at [address], [place] [phone] at time of discharge. There are no guns or weapons in her home and there are no guns or weapons in the pt and her 's home that she is aware of. Jessica has no concerns for the pt at this time.

## 2025-06-10 NOTE — GROUP NOTE
Group Therapy Note    Date: 6/10/2025    Group Start Time: 1600  Group End Time: 1645  Group Topic: Education Group - Inpatient    [facility]  1    Zina Raya RN        Group Therapy Note    Attendees: 15 out of 24 patients participated in Nursing Education Group on Emotional Triggers       Patient's Goal:  to learn the importance of identifying emotional triggers    Status After Intervention:  Improved    Participation Level: Active Listener and Interactive    Participation Quality: Appropriate, Attentive, Sharing, and Supportive      Speech:  normal      Thought Process/Content: Logical      Affective Functioning: Congruent      Mood: calm and cooperative      Level of consciousness:  Alert and Oriented x4      Response to Learning: Able to verbalize current knowledge/experience, Able to verbalize/acknowledge new learning, and Able to retain information      Endings: None Reported    Modes of Intervention: Education      Discipline Responsible: Registered Nurse      Signature:  Zina Raya RN

## 2025-06-10 NOTE — GROUP NOTE
Shared goal for the day as to shower.                                                                       Group Therapy Note    Date: 6/10/2025    Group Start Time: 0930  Group End Time: 1010  Group Topic: Community Meeting    [facility]  1    Jazmyn Barillas, WALDOS    Type of Group: Community Meeting      Patient's Goal:  Patient will be able to id staffing assignments, expectations of patients, and general information re: floor rules. Will be prompted to share goal for the day.     Notes:  Patient appeared to be an active listener, taking in information presented and was prompted to share goal for the day.    Status After Intervention:  Improved    Participation Level: Active Listener    Participation Quality: Appropriate and Attentive      Speech:  normal      Thought Process/Content: Logical      Affective Functioning: Congruent      Mood: euthymic      Level of consciousness:  Alert, Oriented x4, and Attentive      Response to Learning: Able to verbalize/acknowledge new learning, Able to retain information, and Progressing to goal      Endings: None Reported    Modes of Intervention: Support, Socialization, and Clarifying      Discipline Responsible: Psychoeducational Specialist      Signature:  JOSR King

## 2025-06-10 NOTE — H&P
Department of Psychiatry  History and Physical - Adult       Patient personally seen and examined by me and mental status exam performed.  I agree the below assessment by the medical student.  Psychomotor evaluation revealed no agitation retardation any abnormal movements.  Their eye contact is fair their speech is normal rate rhythm and tone.  Their mood is \"I feel okay.\"  Affect is mood incongruent flat and blunted underlying irritability.  Their thought process is linear without flight of ideas loose associations.  Thought contents devoid of any auditory visual hallucinations delusions or any other perceptual abnormalities.  They deny suicidal homicidal ideations intent or plan.  They are able to repeat words and phrases, they are vocabulary is intact he has knowledge of current events and past events recent remote memory are intact   impulse control is adequate cognitive function peers to be at their baseline their insight judgment is fair they are alert oriented time place and person          CHIEF COMPLAINT: \"I didn't want to hurt myself so I came in.\"    Patient was seen after discussing with the treatment team and reviewing the chart.    CIRCUMSTANCES OF ADMISSION:     Patient name: Daniela De La Cruz  Patient's past mental health and addiction history: ADHD, autism spectrum disorder, anxiety, depression, bipolar 1.  Patient's presentation to the ED and why the patient needs admission: Suicidal Ideation  Legal status:  []  Voluntary  [x]  Involuntary  []  Probate  Triggering/precipitating events: Patient had argument with  and he told her to leave, she left and felt she was a danger to herself so she drove to the ER.  Duration of triggering/precipitating events: Developing over the past few weeks.    HISTORY OF PRESENT ILLNESS:      The patient is a 29 y.o. female with significant past psychiatric history of ADHD, autism spectrum disorder, anxiety, depression, bipolar 1.    Who presented to the ED for

## 2025-06-10 NOTE — GROUP NOTE
Group Therapy Note    Date: 6/10/2025    Group Start Time: 1600  Group End Time: 1645  Group Topic: Education Group - Inpatient    [facility]    Zina Raya, RN        Group Therapy Note    Attendees: 15 of 24 patients participated in Nursing Education Group on Emotional Triggers         Patient's Goal:  ***    Notes:  ***    Status After Intervention:  {Status After Intervention:331499620}    Participation Level: {Participation Level:628644584}    Participation Quality: {[facility] PARTICIPATION QUALITY:216144393}      Speech:  {[facility] CD_SPEECH:99433}      Thought Process/Content: {Thought Process/Content:880974232}      Affective Functioning: {Affective Functionin}      Mood: {Mood:679003975}      Level of consciousness:  {Level of consciousness:521028138}      Response to Learning: {[facility] Responses to Learnin}      Endings: {[facility] Endings:08312}    Modes of Intervention: {[facility] Modes of Intervention:399836798}      Discipline Responsible: {[facility] Multidisciplinary:015192904}      Signature:  Zina Raya, RN

## 2025-06-11 LAB
MICROORGANISM SPEC CULT: NORMAL
SPECIMEN DESCRIPTION: NORMAL

## 2025-06-11 PROCEDURE — 6370000000 HC RX 637 (ALT 250 FOR IP): Performed by: NURSE PRACTITIONER

## 2025-06-11 PROCEDURE — 1240000000 HC EMOTIONAL WELLNESS R&B

## 2025-06-11 RX ADMIN — OXCARBAZEPINE 150 MG: 150 TABLET, FILM COATED ORAL at 09:26

## 2025-06-11 RX ADMIN — OXCARBAZEPINE 150 MG: 150 TABLET, FILM COATED ORAL at 20:59

## 2025-06-11 RX ADMIN — ATOMOXETINE 100 MG: 100 CAPSULE ORAL at 20:59

## 2025-06-11 RX ADMIN — FLUOXETINE HYDROCHLORIDE 40 MG: 20 CAPSULE ORAL at 21:00

## 2025-06-11 ASSESSMENT — PAIN SCALES - GENERAL: PAINLEVEL_OUTOF10: 0

## 2025-06-11 NOTE — CARE COORDINATION
Pt was seen during treatment team. Pt stated she just wants to be discharged and she has a 4 yo daughter who misses her. Pt plans to stay with her grandmother at time of discharge. Pt denied SI/HI/AVH and she expressed feeling better than when she came in. Pt is looking forward to starting her new job on 6/23 and spending time away from her . NAKUL informed the pt the  with [facility] will call her sometime next week to schedule an appointment. Pt wants to be scheduled with Dr. Gonzalez at [facility] due to there being no med provider at [facility]. Pt cooperative, fair insight/judgement.     NAKUL contacted [facility] [phone] and scheduled a follow up appointment on 6/16.

## 2025-06-11 NOTE — GROUP NOTE
Group Therapy Note    Date: 6/11/2025    Group Start Time: 1045  Group End Time: 1130  Group Topic: Psychotherapy    [facility]    Kim Porras MSW, LSW        Group Therapy Note    Attendees: 8       Patient's Goal:  To increase social interaction and improve relationships with others.      Notes:  Pt was attentive in group and was able to identify an agenda. They were also able to verbalize relating to others within the group.      Status After Intervention:  Improved    Participation Level: Active Listener and Interactive    Participation Quality: Appropriate, Attentive, Sharing, and Supportive      Speech:  normal      Thought Process/Content: Logical      Affective Functioning: Congruent      Mood: anxious      Level of consciousness:  Alert and Oriented x4      Response to Learning: Able to verbalize current knowledge/experience, Able to verbalize/acknowledge new learning, and Able to retain information      Endings: None Reported    Modes of Intervention: Support, Socialization, and Exploration      Discipline Responsible: /Counselor      Signature:  KENNETH Manzano LSW

## 2025-06-11 NOTE — GROUP NOTE
Group Therapy Note    Date: 6/11/2025    Group Start Time: 0955  Group End Time: 1030  Group Topic: Psychoeducation    [facility]    Jazmyn Barillas, JOSR    Type of Group: Psychoeducation    Module Name:  building your coping toolbox     Patient's Goal:  pt will be able to id additional ways to improve his or her own coping skills for   Remaining well.     Notes:  pleasant and engaged in group discussion. Accepting of handout.     Status After Intervention:  Improved    Participation Level: Active Listener and Interactive    Participation Quality: Appropriate, Attentive, and Sharing      Speech:  normal      Thought Process/Content: Logical  Linear      Affective Functioning: Congruent      Mood: euthymic      Level of consciousness:  Alert, Oriented x4, and Attentive      Response to Learning: Able to verbalize/acknowledge new learning, Able to retain information, and Progressing to goal      Endings: None Reported    Modes of Intervention: Education, Support, Socialization, Exploration, and Problem-solving      Discipline Responsible: Psychoeducational Specialist      Signature:  JOSR King

## 2025-06-11 NOTE — GROUP NOTE
Shared goal for the day as to finish the leaves in the puzzle.                                                                       Group Therapy Note    Date: 6/11/2025    Group Start Time: 0935  Group End Time: 0955  Group Topic: Community Meeting    [facility]    Jazmyn Barillas, JOSR    Type of Group: Community Meeting      Patient's Goal:  Patient will be able to id staffing assignments, expectations of patients, and general information re: floor rules. Will be prompted to share goal for the day.     Notes:  Patient appeared to be an active listener, taking in information presented and was prompted to share goal for the day.    Status After Intervention:  Improved    Participation Level: Active Listener and Interactive    Participation Quality: Appropriate, Attentive, and Sharing      Speech:  normal      Thought Process/Content: Logical      Affective Functioning: Congruent      Mood: euthymic      Level of consciousness:  Alert, Oriented x4, and Attentive      Response to Learning: Able to verbalize/acknowledge new learning, Able to retain information, and Progressing to goal      Endings: None Reported    Modes of Intervention: Support, Socialization, and Clarifying      Discipline Responsible: Psychoeducational Specialist      Signature:  JOSR King

## 2025-06-12 VITALS
HEIGHT: 61 IN | DIASTOLIC BLOOD PRESSURE: 83 MMHG | TEMPERATURE: 97.5 F | WEIGHT: 135 LBS | OXYGEN SATURATION: 100 % | RESPIRATION RATE: 15 BRPM | HEART RATE: 91 BPM | SYSTOLIC BLOOD PRESSURE: 115 MMHG | BODY MASS INDEX: 25.49 KG/M2

## 2025-06-12 LAB
MICROORGANISM SPEC CULT: NORMAL
SPECIMEN DESCRIPTION: NORMAL

## 2025-06-12 PROCEDURE — 6370000000 HC RX 637 (ALT 250 FOR IP): Performed by: NURSE PRACTITIONER

## 2025-06-12 RX ORDER — OXCARBAZEPINE 150 MG/1
150 TABLET, FILM COATED ORAL 2 TIMES DAILY
Qty: 60 TABLET | Refills: 0 | Status: SHIPPED | OUTPATIENT
Start: 2025-06-12 | End: 2025-07-12

## 2025-06-12 RX ADMIN — OXCARBAZEPINE 150 MG: 150 TABLET, FILM COATED ORAL at 09:07

## 2025-06-12 ASSESSMENT — PAIN SCALES - GENERAL: PAINLEVEL_OUTOF10: 0

## 2025-06-12 NOTE — GROUP NOTE
Group Therapy Note    Date: 6/12/2025    Group Start Time: 0930  Group End Time: 0945  Group Topic: Community Meeting    [facility]    Bruna Hebert CTRS    Group Therapy Note    Attendees: 4    Date: 6/12/2025  Start Time: 0930  End Time:  0945  Number of Participants: 4    Type of Group: Community Meeting    Patient's Goal:  Increased awareness of expectations of the milieu, daily staffing and programming. Identified goal for the day.    Notes:  Patient was an active listener in group. Patient shared goal for the day as, \"Keep working on my koala puzzle.\"    Status After Intervention:  Improved    Participation Level: Active Listener and Interactive    Participation Quality: Appropriate, Attentive, and Sharing      Speech:  normal      Thought Process/Content: Logical  Linear      Affective Functioning: Congruent      Mood:  Appropriate      Level of consciousness:  Alert and Attentive      Response to Learning: Able to verbalize current knowledge/experience, Able to verbalize/acknowledge new learning, Able to retain information, Capable of insight, Able to change behavior, and Progressing to goal      Endings: None Reported    Modes of Intervention: Education, Support, Socialization, Exploration, Clarifying, and Problem-solving      Discipline Responsible: Psychoeducational Specialist      Signature:  JOSR Christensen

## 2025-06-12 NOTE — TRANSITION OF CARE
Behavioral Health Transition Record    Patient Name: Daniela De La Cruz  YOB: 1995   Medical Record Number: [id number]  Date of Admission: 6/9/2025  6:27 AM   Date of Discharge: 6/12/2025    Attending Provider: Mikey Lemon MD   Discharging Provider: Mikey Lemon MD  To contact this individual call [phone] and ask the  to page.  If unavailable, ask to be transferred to Behavioral Health Provider on call.  A Behavioral Health Provider will be available on call 24/7 and during holidays.    Primary Care Provider: None, None    No Known Allergies    Reason for Admission:   Patient name: Daniela De La Cruz  Patient's past mental health and addiction history: ADHD, autism spectrum disorder, anxiety, depression, bipolar 1.  Patient's presentation to the ED and why the patient needs admission: Suicidal Ideation  Legal status:  []  Voluntary  [x]  Involuntary  []  Probate  Triggering/precipitating events: Patient had argument with  and he told her to leave, she left and felt she was a danger to herself so she drove to the ER.  Duration of triggering/precipitating events: Developing over the past few weeks.    Admission Diagnosis: Suicidal ideation [R45.851]  Bipolar 1 disorder, manic, moderate (HCC) [F31.12]    * No surgery found *    Results for orders placed or performed during the hospital encounter of 06/09/25   Culture, Gonococcus    Specimen: Urine   Result Value Ref Range    Specimen Description .URINE     Culture No Neisseria gonorrhoeae isolated    TRICHOMONAS SCREEN    Specimen: Other   Result Value Ref Range    Specimen Description .OTHER     Culture NO TRICHOMONAS SEEN    CBC with Auto Differential   Result Value Ref Range    WBC 8.6 4.5 - 11.5 k/uL    RBC 3.90 3.50 - 5.50 m/uL    Hemoglobin 13.0 11.5 - 15.5 g/dL    Hematocrit 38.7 34.0 - 48.0 %    MCV 99.2 80.0 - 99.9 fL    MCH 33.3 26.0 - 35.0 pg    MCHC 33.6 32.0 - 34.5 g/dL    RDW 13.1 11.5 - 15.0 %    Platelets 257 130 -  Occupational Therapy Evaluation      Emilee Chau    1/27/2020    Principal Problem:    Periorbital cellulitis of left eye  Active Problems:    Benign essential hypertension    Late onset Alzheimer's disease without behavioral disturbance (HCC)    Hyperlipidemia    Herpes zoster conjunctivitis of left eye    Elevated serum creatinine with CKD stage 3      Past Medical History:   Diagnosis Date    Alzheimer disease ([facility] [address] ) 2018    Arthritis     Dementia ([facility] )     Encounter for screening for osteoporosis     HL (hearing loss)     Hyperlipidemia     Hypertension     Sciatica 2018       Past Surgical History:   Procedure Laterality Date    SPINE SURGERY  1980    lumbar surgery for pinched nerve         01/27/20 1013   Note Type   Note type Eval only   Restrictions/Precautions   Weight Bearing Precautions Per Order No   Other Precautions Contact/isolation; Airborne/isolation;Cognitive; Bed Alarm;Multiple lines; Fall Risk   Pain Assessment   Pain Assessment FLACC   Pain Rating: FLACC (Rest) - Face 0   Pain Rating: FLACC (Rest) - Legs 0   Pain Rating: FLACC (Rest) - Activity 0   Pain Rating: FLACC (Rest) - Cry 0   Pain Rating: FLACC (Rest) - Consolability 0   Score: FLACC (Rest) 0   Pain Rating: FLACC (Activity) - Face 0   Pain Rating: FLACC (Activity) - Legs 0   Pain Rating: FLACC (Activity) - Activity 0   Pain Rating: FLACC (Activity) - Cry 0   Pain Rating: FLACC (Activity) - Consolability 0   Score: FLACC (Activity) 0   Home Living   Type of Home House   Home Layout One level;Stairs to enter with rails  (2-3 JULIANNA)   Bathroom Toilet Standard   Prior Function   Level of [place] Needs assistance with ADLs and functional mobility   Lives With Family; Other (Comment)  (24/7 live in aide)   [place] [address] Help From Family;Personal care attendant   ADL Assistance Needs assistance   IADLs Needs assistance   Falls in the last 6 months   (reports multiple falls, however, none lately)   Comments pt unable to give PLOF, son present, but inconsistent w hx   Lifestyle   Autonomy has 24/7 live in aide   Reciprocal Relationships supportive son   Psychosocial   Psychosocial (WDL) WDL   ADL   Eating Assistance 2  Maximal Assistance  (needs to be fed)   Grooming Assistance 1  Total Assistance   UB Bathing Assistance 1  Total Assistance   LB Bathing Assistance 1  Total Assistance   UB Dressing Assistance 1  Total Assistance   LB Dressing Assistance 1  Total Assistance   Toileting Assistance  1  Total Assistance   Bed Mobility   Supine to Sit 1  Dependent   Additional items Assist x 2; Increased time required;Verbal cues   Sit to Supine 1  Dependent   Additional items Assist x 2; Increased time required;LE management   Transfers   Sit to Stand Unable to assess  (not safe at time of evaluation due to stiffness, tremors,)   Balance   Static Sitting Zero  (max assist for EOB)   Activity Tolerance   Activity Tolerance Patient limited by fatigue;Treatment limited secondary to medical complications (Comment)   Nurse Made Aware appropriate to see   RUE Assessment   RUE Assessment   (PROM WFL)   LUE Assessment   LUE Assessment   (PROM WFL)   Vision - Complex Assessment   Additional Comments unable to assess vision   Cognition   Overall Cognitive Status Impaired   Arousal/Participation Cooperative   Attention Difficulty attending to directions   Orientation Level Oriented to person;Disoriented to place; Disoriented to time;Disoriented to situation   Memory Decreased recall of precautions;Decreased recall of recent events;Decreased short term memory; Unable to assess   Following Commands Follows one step commands inconsistently   Comments pt basically non verbal, incoherent   Assessment   Limitation Decreased ADL status; Decreased Safe judgement during ADL;Decreased cognition;Decreased endurance;Decreased self-care trans;Decreased high-level ADLs   Assessment Pt is a 79 y o  female seen for OT evaluation s/p admit to [address] [place] [address] on 1/24/2020 w/ Periorbital cellulitis of left eye  Pt is currently on contact and airborne precautions  Comorbidities affecting pt's functional performance at time of assessment include: HTN, alzheimers disease, CKD, urinary incontinence, falls  Personal factors affecting pt at time of IE include:steps to enter environment, difficulty performing ADLS, difficulty performing IADLS , limited insight into deficits and decreased initiation and engagement   Prior to admission, pt was living at home w her son as well as a 24/7 live in aide  She was apparently able to walk, but needing assist w self care due to her dementia  Son reports pt being unable to feed herself the past 3-4 weeks  Upon evaluation: Pt requires total assist of 2 for bedmobiltiy, dependent for all self care  She does not follow any commands (very inconsistent), is basically non-verbal  Dependent x 2 for sitting at EOB, poor/zero balance, jerky movements, tremors, rigid  Son asked to get patient up to the chair  Therapist explained it is currently not safe as patient follows zero commands, jerky movements and zero balance at EOB  decreased strength, decreased balance, decreased tolerance, impaired attention, impaired initiation, impaired memory, impaired sequencing, impaired problem solving and decreased safety awareness  Pt to benefit from trial of  skilled OT tx while in the hospital to address deficits as defined above and maximize level of functional independence w ADL's and functional mobility  Occupational Performance areas to address include: eating, grooming, functional mobility and assist w safest dc recommendation  Pt scored   0/100 on the barthel index  Based on findings from OT evaluation and functional performance deficits, pt has been identified as a  High complexity evaluation  From OT standpoint, recommendation at time of d/c would be short term rehab vs 24/7 assist vs LTC    Goals   Patient Goals none verbalized   son wants to take her home   STG Time Frame   (7 days)   Short Term Goal #1 pt will participate in bedmobilty w mod assist of 1 in order to lessen caretaker burden   Short Term Goal #2 fair balance sitting at EOB x 10 min for increased safety w mobilty   Short Term Goal  participate in PROM/AAROM/AROM activities both UE's   LTG Time Frame 10-14   Long Term Goal #1 participate in sit to stand w min assist as appropriate   Long Term Goal #2 SPT to chair/bsc as appropriate w mod assist and use of most appropriate DME   Long Term Goal assess for DME needs at DC   ADL Goals   Pt Will Perform Eating With min assist   Plan   Treatment Interventions ADL retraining;Functional transfer training;UE strengthening/ROM; Endurance training;Cognitive reorientation;Patient/family training; Compensatory technique education   Goal Expiration Date 02/10/20   OT Frequency 1-2x/wk   Recommendation   OT Discharge Recommendation 24 hour supervision/assist  (vs rehab vs 24/care)   Barthel Index   Feeding 0   Bathing 0   Grooming Score 0   Dressing Score 0   Bladder Score 0   Bowels Score 0   Toilet Use Score 0   Transfers (Bed/Chair) Score 0   Mobility (Level Surface) Score 0   Stairs Score 0   Barthel Index Score 0

## 2025-06-12 NOTE — GROUP NOTE
Group Therapy Note    Date: 6/12/2025    Group Start Time: 0945  Group End Time: 1015  Group Topic: Psychoeducation    [facility]    Bruna Hebert CTRS    Group Therapy Note    Attendees: 5    Date: 6/12/2025  Start Time: 0945  End Time:  1015  Number of Participants: 5    Type of Group: Psychoeducation    Name:  Finding Balance    Patient's Goal:  Identified what is balance, how balance contributes to mental health and healthy ways to be in balance.    Notes:  CTRS led educational group discussion on finding balance. Encouraged patients to share their experiences. Patient was actively engaged in group discussion and made positive responses.    Status After Intervention:  Improved    Participation Level: Active Listener and Interactive    Participation Quality: Appropriate, Attentive, and Sharing      Speech:  normal      Thought Process/Content: Logical  Linear      Affective Functioning: Congruent      Mood:  Appropriate      Level of consciousness:  Alert and Attentive      Response to Learning: Able to verbalize current knowledge/experience, Able to verbalize/acknowledge new learning, Able to retain information, Capable of insight, Able to change behavior, and Progressing to goal      Endings: None Reported    Modes of Intervention: Education, Support, Socialization, Exploration, Clarifying, and Problem-solving      Discipline Responsible: Psychoeducational Specialist      Signature:  JOSR Christensen

## 2025-06-12 NOTE — CARE COORDINATION
NAKUL met with the pt to discuss her discharge. Pt reports feeling good today, denied SI/HI/AVH. Pt expressed feeling better than when she came in and she feels ready to be discharged at this time. Pt plans to stay with her grandmother and her car is here. Pt will continue to treat with [facility] at time of discharge. Collateral confirmed the pt does not have access to any guns or weapons. Pt cooperative, pleasant, appropriate, with good eye contact, clear speech, improved insight/judgement.     NAKUL contacted pt's grandmother Jessica [phone] (MARCEL signed) and notified her of pt's discharge for today. Jessica has no concerns for the pt discharging at this time.     In order to ensure appropriate transition and discharge planning is in place, the following documents have been transmitted to [facility], as the new outpatient provider:    The d/c diagnosis was transmitted to the next care provider  The reason for hospitalization was transmitted to the next care provider  The d/c medications (dosage and indication) were transmitted to the next care provider   The continuing care plan was transmitted to the next care provider

## 2025-06-12 NOTE — DISCHARGE SUMMARY
DISCHARGE SUMMARY      Patient ID:  Daniela De La Cruz  [id number]  29 y.o.  1995    Admit date: 6/9/2025    Discharge date and time: 6/12/2025    Admitting Physician: Mikey Lemon MD     Discharge Physician: Dr Xochilt VILLEGAS    Discharge Diagnoses:   Patient Active Problem List   Diagnosis    PMDD (premenstrual dysphoric disorder)    GBS bacteriuria    Major depressive disorder, recurrent episode with mixed features    Cluster B personality disorder (HCC)       Admission Condition: poor    Discharged Condition: stable    Admission Circumstance:   Patient name: Daniela De La Cruz  Patient's past mental health and addiction history: ADHD, autism spectrum disorder, anxiety, depression, bipolar 1.  Patient's presentation to the ED and why the patient needs admission: Suicidal Ideation  Legal status:  []  Voluntary  [x]  Involuntary  []  Probate  Triggering/precipitating events: Patient had argument with  and he told her to leave, she left and felt she was a danger to herself so she drove to the ER.  Duration of triggering/precipitating events: Developing over the past few weeks.      PAST MEDICAL/PSYCHIATRIC HISTORY:   History reviewed. No pertinent past medical history.    FAMILY/SOCIAL HISTORY:  History reviewed. No pertinent family history.  Social History     Socioeconomic History    Marital status:      Spouse name: Not on file    Number of children: 1    Years of education: 18    Highest education level: Not on file   Occupational History    Not on file   Tobacco Use    Smoking status: Never    Smokeless tobacco: Never   Vaping Use    Vaping status: Never Used   Substance and Sexual Activity    Alcohol use: Not Currently     Comment: occasinoally    Drug use: No    Sexual activity: Defer   Other Topics Concern    Not on file   Social History Narrative    Not on file     Social Drivers of Health     Financial Resource Strain: Not on file   Food Insecurity: No Food Insecurity (6/9/2025)    Hunger Vital Sign

## 2025-06-12 NOTE — GROUP NOTE
Group Therapy Note    Date: 6/12/2025    Group Start Time: 1045  Group End Time: 1120  Group Topic: Psychotherapy    [facility]    Allison Storey MSW, LSW        Group Therapy Note    Attendees: 13       Patient's Goal:  To increase social interaction and improve relationships with others.      Notes:  Pt was attentive in group and was able to identify an agenda. They were also able to verbalize relating to others within the group.      Status After Intervention:  Improved    Participation Level: Active Listener and Interactive    Participation Quality: Appropriate, Attentive, Sharing, and Supportive      Speech:  normal      Thought Process/Content: Logical  Linear      Affective Functioning: Congruent      Mood: anxious      Level of consciousness:  Alert, Oriented x4, and Attentive      Response to Learning: Able to verbalize current knowledge/experience, Able to verbalize/acknowledge new learning, Able to retain information, and Capable of insight      Endings: None Reported    Modes of Intervention: Support, Socialization, and Exploration      Discipline Responsible: /Counselor      Signature:  KENNETH Ty LSW

## 2025-06-12 NOTE — PLAN OF CARE
Problem: Anxiety  Goal: Will report anxiety at manageable levels  Description: INTERVENTIONS:1. Administer medication as ordered2. Teach and rehearse alternative coping skills3. Provide emotional support with 1:1 interaction with staff  6/12/2025 1043 by Dinorah Marcus RN  Outcome: Progressing     Problem: Behavior  Goal: Pt/Family maintain appropriate behavior and adhere to behavioral management agreement, if implemented  Description: INTERVENTIONS:1. Assess patient/family's coping skills and  non-compliant behavior (including use of illegal substances)2. Notify security of behavior or suspected illegal substances which indicate the need for search of the family and/or belongings3. Encourage verbalization of thoughts and concerns in a socially appropriate manner4. Utilize positive, consistent limit setting strategies supporting safety of patient, staff and others5. Encourage participation in the decision making process about the behavioral management agreement6. If a visitor's behavior poses a threat to safety call refer to organization policy.7. Initiate consult with , Psychosocial CNS, Spiritual Care as appropriate  6/12/2025 1043 by Dinorah Marcus RN  Outcome: Progressing     Problem: Depression/Self Harm  Goal: Effect of psychiatric condition will be minimized and patient will be protected from self harm  Description: INTERVENTIONS:1. Assess impact of patient's symptoms on level of functioning, self care needs and offer support as indicated2. Assess patient/family knowledge of depression, impact on illness and need for teaching3. Provide emotional support, presence and reassurance4. Assess for possible suicidal thoughts or ideation. If patient expresses suicidal thoughts or statements do not leave alone, initiate Suicide Precautions, move to a room close to the nursing station and obtain sitter5. Initiate consults as appropriate with Mental Health Professional, Spiritual Care, Psychosocial CNS, 
  Problem: Anxiety  Goal: Will report anxiety at manageable levels  Description: INTERVENTIONS:1. Administer medication as ordered2. Teach and rehearse alternative coping skills3. Provide emotional support with 1:1 interaction with staff  Outcome: Progressing     Problem: Coping  Goal: Pt/Family able to verbalize concerns and demonstrate effective coping strategies  Description: INTERVENTIONS:1. Assist patient/family to identify coping skills, available support systems and cultural and spiritual values2. Provide emotional support, including active listening and acknowledgement of concerns of patient and caregivers3. Reduce environmental stimuli, as able4. Instruct patient/family in relaxation techniques, as appropriate5. Assess for spiritual pain/suffering and initiate Spiritual Care, Psychosocial Clinical Specialist consults as needed  Outcome: Progressing     Problem: Death & Dying  Goal: Pt/Family communicate acceptance of impending death and feel psychological comfort and peace  Description: INTERVENTIONS:1. Assess patient/family anxiety and grief process related to end of life issues2. Provide emotional and spiritual support3. Provide information about the patient's health status with consideration of family and cultural values4. Communicate willingness to discuss death and facilitate grief process  with patient/family as appropriate5. Emphasize sustaining relationships within family system and community, or bashir/spiritual traditions6. Initiate Spiritual Care, Psychosocial Clinical Specialist, consult as needed  Outcome: Progressing     Problem: Change in Body Image  Goal: Pt/Family communicate acceptance of loss or change in body image and feel psychological comfort and peace  Description: INTERVENTIONS:1. Assess patient/family anxiety and grief process related to change in body image, loss of functional status, loss of sense of self, and forgiveness2. Provide emotional and spiritual support3. Provide 
Contain excessive/inappropriate behavior per unit and hospital policies  Outcome: Progressing     
involuntary admission procedures and rules3. Contain excessive/inappropriate behavior per unit and hospital policies  6/10/2025 2251 by Paulo Dewitt, RN  Outcome: Progressing     
Consultation  Outcome: Progressing     Problem: Involuntary Admit  Goal: Will cooperate with staff recommendations and doctor's orders and will demonstrate appropriate behavior  Description: INTERVENTIONS:1. Treat underlying conditions and offer medication as ordered2. Educate regarding involuntary admission procedures and rules3. Contain excessive/inappropriate behavior per unit and hospital policies  Outcome: Progressing     
refer to organization policy     
dressing, as needed to reduce the risk of wandering   Collaborate with family members/caregivers to mitigate the elopement risk   Collaborate with treatment team for nicotine replacement   Collaborate with treatment team for drug withdrawal symptoms treatment   Communicate/escalate to charge nurse the risk of elopement   Communicate/escalate to nursing supervisor the risk of elopement   Communicate/escalate to /other team member the risk of elopement   Communicate to physician the risk for elopement   Escort with two staff members if patient must leave the unit   Make sure patient has all necessary personal care items   Shoes and clothing collected and placed in gown attire   Reduce environmental triggers   Place patient in room far away from exits and stairways  Taken 6/9/2025 1645 by Zina Raya RN  Nursing Interventions for Elopement Risk:   Assist with personal care needs such as toileting, eating, dressing, as needed to reduce the risk of wandering   Collaborate with family members/caregivers to mitigate the elopement risk   Collaborate with treatment team for drug withdrawal symptoms treatment   Collaborate with treatment team for nicotine replacement   Communicate/escalate to /other team member the risk of elopement   Communicate to physician the risk for elopement   Escort with two staff members if patient must leave the unit   Make sure patient has all necessary personal care items   Communicate/escalate to nursing supervisor the risk of elopement   Place patient in room far away from exits and stairways   Shoes and clothing collected and placed in gown attire   Reduce environmental triggers

## 2025-06-12 NOTE — PROGRESS NOTES
[facility]  Admission Note     Admission Type:   Admission Type: Involuntary    Reason for admission:  Reason for Admission: suicidal with no plan since age 7, having marriage issues, says  is taking her 5 year old child because she said she needs her space, felt overwhelmed and wanted to get out of the house before she did something      Addictive Behavior:   Addictive Behavior  In the Past 3 Months, Have You Felt or Has Someone Told You That You Have a Problem With  : None    Medical Problems:   History reviewed. No pertinent past medical history.    Status EXAM:  Mental Status and Behavioral Exam  Normal: No  Level of Assistance: Independent/Self  Facial Expression: Sad, Avoids Gaze  Affect: Constricted  Level of Consciousness: Alert  Frequency of Checks: 4 times per hour, close  Mood:Normal: No  Mood: Depressed, Anxious, Sad, Helpless  Motor Activity:Normal: No  Eye Contact: Good  Observed Behavior: Tearful, Cooperative  Sexual Misconduct History: Current - no  Preception: [place] to person, [place] to time, [place] to place, [place] to situation  Attention:Normal: No  Attention: Distractible  Thought Processes: Tangential, Flight of ideas  Thought Content:Normal: No  Thought Content: Obsessions  Depression Symptoms: Sleep disturbance, Loss of interest, Isolative, Change in energy level, Feelings of hopelessess, Increased irritability  Anxiety Symptoms: Generalized  Mei Symptoms: Poor judgment, Pressured speech  Hallucinations: None  Delusions: No  Memory:Normal: Yes  Insight and Judgment: No  Insight and Judgment: Poor insight, Poor judgment    Tobacco Screening:  Practical Counseling, on admission, dina X, if applicable and completed (first 3 are required if patient doesn't refuse):            ( ) Recognizing danger situations (included triggers and roadblocks)                    ( ) Coping skills (new ways to manage stress,relaxation techniques, changing routine, distraction)            
4 Eyes Skin Assessment     NAME:  Daniela De La Cruz  YOB: 1995  MEDICAL RECORD NUMBER:  [id number]    The patient is being assessed for  Admission    I agree that at least one RN has performed a thorough Head to Toe Skin Assessment on the patient. ALL assessment sites listed below have been assessed.      Areas assessed by both nurses:    Head, Face, Ears, Shoulders, Back, Chest, Arms, Elbows, Hands, Sacrum. Buttock, Coccyx, Ischium, Legs. Feet and Heels, and Under Medical Devices         Does the Patient have a Wound? No noted wound(s)       Jeff Prevention initiated by RN: Yes  Wound Care Orders initiated by RN: No    Pressure Injury (Stage 3,4, Unstageable, DTI, NWPT, and Complex wounds) if present, place Wound referral order by RN under : No    New Ostomies, if present place, Ostomy referral order under : No     Nurse 1 eSignature: Electronically signed by Zina Raya RN on 6/9/25 at 5:17 PM EDT    **SHARE this note so that the co-signing nurse can place an eSignature**    Nurse 2 eSignature: Electronically signed by Miguel Calles LPN on 6/9/25 at 5:42 PM EDT  
CLINICAL PHARMACY NOTE: MEDS TO BEDS    Total # of Prescriptions Filled: 1   The following medications were delivered to the patient:  Oxcarbazepine 150 mg    Additional Documentation: delivered to [facility]    
Home medications verified with [facility] pharmacist Snehal [phone]    Strattera 100 mg daily  Prozac 40 mg daily  
Leisure assessment   06/10/25 1501   Activities of Daily Living   Patient Requires assistance with daily self-care activities? No   Leisure Activity 1   3 Favorite Leisure Activities work be with my kid   Frequency > 2 hours/day   Last time this month   Barriers to participating  motivation;social (ie. no one to do it with)   Leisure Activity 2   Favorite Leisure Activities  same   Leisure Activity 3   Favorite Leisure Activities  same   Social   Patient reports spending the majority of their free time with one other person   Patient verbalizes a preference for spending free time with one other person   Patient’s perception of support system more healthy   Patient’s perception of barriers to socializing with others include(s) lack of comfort;lack of motivation/interest   Beliefs & Coping   Has difficulty dealing with feelings   Yes   Internalizes feelings/Keeps feelings in Yes   Externalizes feelings through aggressiveness or poor temper control  Yes   Feels uncomfortable around others  Yes   Has difficulty talking to others  No   Depends on others for direction or decisions No   Difficulty dealing with anger of others  Yes   Difficulty dealing with own anger  Yes   Difficulty managing stress Yes   Frequently has difficulty with relationships  No   Has recently perceived/experienced loss, disappointment, humiliation or failure  Yes  (arguing with )   General perception about self likes self   Attitude about abilities more successful than not   Locus of Control  most of the time   Belief about recovery Recovery is possible   Patient Identified Strengths  good mom and creative and crafty good with kids   Patient Identified Limitations  my  and just found out i am austistic   Perception of most stressful event prior to hospitalization overwhelmed and issues with marriage   Perception of changes needed stay away from    Strengths and Limitations   Strengths Independent in basic self-care 
Patient denies suicidal ideation, homicidal ideations and AVH.  Patient sates \"always\" to anxiety and depression rating it a 3 out 10.  Patient states she is feeling \"good.\"  Patient appears slightly anxious but brightened and friendly with conversation.  Presents calm and cooperative during assessment.  Patient is out on the unit and is social with peers.  Medications taken without issue.  No complaints or concerns verbalized at this time.  No unit problems reported.  Will continue to observe and support.          
Patient is resting quietly in bed with eyes closed at this time.  No signs of distress or discomfort noted.  No PRN medications given thus far.  Safety needs met.  No unit problems reported.  Purposeful rounding continued.  
AM     Lab Results   Component Value Date/Time    TSH 0.81 02/17/2020 12:00 PM     No results found for: \"LITHIUM\"  No results found for: \"VALPROATE\", \"CBMZ\"        Treatment Plan:  Reviewed current Medications with the patient.   Risks, benefits, side effects, drug-to-drug interactions and alternatives to treatment were discussed.  Collateral information:   CD evaluation  Encourage patient to attend group and other milieu activities.  Discharge planning discussed with the patient and treatment team.    Continue patient's home medications Strattera 100 mg nightly  Prozac 40 mg nightly  Continue Trileptal 150 mg twice daily    PSYCHOTHERAPY/COUNSELING:  [x] Therapeutic interview  [x] Supportive  [] CBT  [] Ongoing  [] Other    [x] Patient continues to need, on a daily basis, active treatment furnished directly by or requiring the supervision of inpatient psychiatric personnel      Anticipated Length of stay: 3 to 7 days based on stability        NOTE: This report was transcribed using voice recognition software. Every effort was made to ensure accuracy; however, inadvertent computerized transcription errors may be present.     Electronically signed by TANIYA Goncalves CNP on 6/11/2025 at 9:39 AM

## 2025-06-12 NOTE — BH NOTE
[facility] [place]  Discharge Note    Pt discharged with followings belongings:   Dental Appliances: None  Vision - Corrective Lenses: Other (Comment) (sunglasses, eye drops)  Hearing Aid: None  Clothing: Shirt, Footwear, Shorts, Undergarments, Socks, Pants  Other Valuables:  ($148 locked up [employer])   Valuables sent home with patient or returned to patient.   Patient educated on aftercare instructions: Yes   Patient verbalize understanding of AVS:  Yes   Status EXAM upon discharge:  Mental Status and Behavioral Exam  Normal: No  Level of Assistance: Independent/Self  Facial Expression: Brightened  Affect: Congruent  Level of Consciousness: Alert  Frequency of Checks: 4 times per hour, close  Mood:Normal: No  Mood: Anxious  Motor Activity:Normal: Yes  Motor Activity: Increased  Eye Contact: Fair  Observed Behavior: Cooperative  Sexual Misconduct History: Current - no  Preception: [place] to person, [place] to time, [place] to situation, [place] to place  Attention:Normal: Yes  Attention: Distractible  Thought Processes: Unremarkable  Thought Content:Normal: Yes  Thought Content: Preoccupations  Depression Symptoms: No problems reported or observed.  Anxiety Symptoms: Generalized  Mei Symptoms: No problems reported or observed.  Hallucinations: None  Delusions: No  Memory:Normal: Yes  Insight and Judgment: No  Insight and Judgment: Poor judgment, Poor insight    Tobacco Screening:  Practical Counseling, on admission, dina X, if applicable and completed (first 3 are required if patient doesn't refuse):            ( ) Recognizing danger situations (included triggers and roadblocks)                    ( ) Coping skills (new ways to manage stress,relaxation techniques, changing routine, distraction)                                                           ( ) Basic information about quitting (benefits of quitting, techniques in how to quit, available resources  ( ) Referral for counseling faxed to Tobacco 
Denies SI, HI and auditory/visual hallucinations.   Denies anxiety and depression.  Pt is compliant with PM medication.  According to previous notes pt did not attend daytime groups. Patient remains seclusive to self/room.     Purposeful Q15min rounding continues.  
Patient alert and oriented x4. Patient denies any suicidal/homicidal ideation. Patient denies any hallucinations/delusions. Patient denies any needs at this time and states, \"Im just waiting to talk to the doctor.\" Patient pleasant with assessment.  
Patient alert and oriented x4. Patient denies suicidal/homicidal ideation. Patient denies hallucinations/delusions. Patient excitable towards treatment here. Patient asking questions to this writer and resident about her new mood stabilizer. Patient is calm and cooperative, friendly on approach.  
Patient is resting quietly in bed with eyes closed at this time.  No signs of distress or discomfort noted.  No PRN medications given thus far.  Safety needs met.  No unit problems reported.  Purposeful rounding continued.  
Patient resting in bed with eyes closed. Respirations even and non labored. Safety Rounds done Q15 Minutes. Will continue to monitor.  
Refused nicotine patch, wants patch discontinued from chart, done per patient request.   
Will review group plans and strategies for care.    Method: Group therapy, Medication compliance, Individualized assessments and Care planning.    Outcome: Needs Reinforcement    PATIENT GOALS: To be discussed with patient within 72 hours    PLAN/TREATMENT RECOMMENDATIONS:     Continue group therapy , Medications compliance, Goal setting, Individualized assessments and Care planning, continue to monitor patient on unit.      SHORT-TERM GOALS:   Time frame for Short-Term Goals: 5-7 days    LONG-TERM GOALS:  Time frame for Long-Term Goals: 6 months  Members Present in Team Meeting: See Signature Sheet    Miguel Steen RN

## 2025-08-01 ENCOUNTER — OFFICE VISIT (OUTPATIENT)
Dept: ORTHOPEDIC SURGERY | Age: 30
End: 2025-08-01
Payer: COMMERCIAL

## 2025-08-01 VITALS — TEMPERATURE: 98.6 F | BODY MASS INDEX: 25.49 KG/M2 | WEIGHT: 135 LBS | HEIGHT: 61 IN

## 2025-08-01 DIAGNOSIS — M75.81 TENDINITIS OF RIGHT ROTATOR CUFF: Primary | ICD-10-CM

## 2025-08-01 DIAGNOSIS — M25.511 RIGHT SHOULDER PAIN, UNSPECIFIED CHRONICITY: Primary | ICD-10-CM

## 2025-08-01 PROCEDURE — G8419 CALC BMI OUT NRM PARAM NOF/U: HCPCS | Performed by: FAMILY MEDICINE

## 2025-08-01 PROCEDURE — G8427 DOCREV CUR MEDS BY ELIG CLIN: HCPCS | Performed by: FAMILY MEDICINE

## 2025-08-01 PROCEDURE — 1036F TOBACCO NON-USER: CPT | Performed by: FAMILY MEDICINE

## 2025-08-01 PROCEDURE — 99203 OFFICE O/P NEW LOW 30 MIN: CPT | Performed by: FAMILY MEDICINE

## 2025-08-01 RX ORDER — NAPROXEN 500 MG/1
500 TABLET ORAL 2 TIMES DAILY PRN
Qty: 60 TABLET | Refills: 0 | Status: SHIPPED | OUTPATIENT
Start: 2025-08-01 | End: 2025-08-31

## 2025-08-01 NOTE — PROGRESS NOTES
facility-administered medications for this visit.      ______________________________________________________________________    Physical Exam:    Vitals: Temp 98.6 °F (37 °C)   Ht 1.549 m (5' 1\")   Wt 61.2 kg (135 lb)   BMI 25.51 kg/m²   General Appearance: Nontoxic, awake, alert, and in no acute distress.  Ear, Nose, and Throat: Normocephalic, atraumatic, moist membranes, anicteric sclera  Chest wall/Lung: Respirations regular and unlabored. No cyanosis.  Heart: RRR, distal pulses intact.  Neurologic: Alert&Oriented x3. Sensation grossly intact. No focal motor deficits detected.  Musculoskeletal: right shoulder:  Full ROM of the Shoulder, pain with abduction  No obvious bony deformity.  No ecchymosis.  TTP: ( - ) AC joint, ( - ) Biceps, ( - ) Coracoid, ( - ) Posterior Joint Line  Impingement Tests: ( - ) Hurt, ( - ) Neer's  Labrum: ( - ) Webb's, ( - ) Speeds, ( - ) DLS, ( - ) Apprehension/Relocation  Rotator cuff: ( - ) Full can, ( + ) Empty can, ( - ) Bear hug, ( - ) Belly press, ( - ) ER weakness   AC Joint: ( - ) Crossover     Imaging:  Multiple views of the right shoulder demonstrate no evidence of fracture, dislocation, or significant degeneration. X-rays were personally reviewed and interpreted by myself. Radiology reports were also reviewed.      ______________________________________________________________________    Assessment & Plan :  1. Tendinitis of right rotator cuff  - naproxen (NAPROSYN) 500 MG tablet; Take 1 tablet by mouth 2 times daily as needed for Pain  Dispense: 60 tablet; Refill: 0      Patient presents to the office today for evaluation of right shoulder pain.  History, referring provider note, physical exam and imaging (as interpreted by me) are consistent with tendinitis of the right rotator cuff.  Treatment options discussed with patient in the office today including activity modification, oral anti-inflammatories, physical therapy, injection options, advanced imaging in the